# Patient Record
Sex: MALE | Race: WHITE
[De-identification: names, ages, dates, MRNs, and addresses within clinical notes are randomized per-mention and may not be internally consistent; named-entity substitution may affect disease eponyms.]

---

## 2018-03-15 ENCOUNTER — HOSPITAL ENCOUNTER (OUTPATIENT)
Dept: HOSPITAL 14 - H.ER | Age: 68
Setting detail: OBSERVATION
LOS: 1 days | Discharge: HOME | End: 2018-03-16
Attending: GENERAL ACUTE CARE HOSPITAL | Admitting: GENERAL ACUTE CARE HOSPITAL
Payer: COMMERCIAL

## 2018-03-15 DIAGNOSIS — Z79.899: ICD-10-CM

## 2018-03-15 DIAGNOSIS — R07.89: Primary | ICD-10-CM

## 2018-03-15 DIAGNOSIS — F41.9: ICD-10-CM

## 2018-03-15 DIAGNOSIS — E78.5: ICD-10-CM

## 2018-03-15 DIAGNOSIS — I10: ICD-10-CM

## 2018-03-15 DIAGNOSIS — Z87.891: ICD-10-CM

## 2018-03-15 DIAGNOSIS — R00.1: ICD-10-CM

## 2018-03-15 DIAGNOSIS — Z90.49: ICD-10-CM

## 2018-03-15 DIAGNOSIS — Z79.82: ICD-10-CM

## 2018-03-15 DIAGNOSIS — N40.0: ICD-10-CM

## 2018-03-15 DIAGNOSIS — Z85.46: ICD-10-CM

## 2018-03-15 DIAGNOSIS — E78.00: ICD-10-CM

## 2018-03-15 LAB
APTT BLD: 29.6 SECONDS (ref 25.6–37.1)
BASOPHILS # BLD AUTO: 0 K/UL (ref 0–0.2)
BASOPHILS NFR BLD: 0.5 % (ref 0–2)
BUN SERPL-MCNC: 18 MG/DL (ref 9–20)
CALCIUM SERPL-MCNC: 9.6 MG/DL (ref 8.4–10.2)
EOSINOPHIL # BLD AUTO: 0.3 K/UL (ref 0–0.7)
EOSINOPHIL NFR BLD: 4.4 % (ref 0–4)
ERYTHROCYTE [DISTWIDTH] IN BLOOD BY AUTOMATED COUNT: 13 % (ref 11.5–14.5)
GFR NON-AFRICAN AMERICAN: > 60
HGB BLD-MCNC: 13.4 G/DL (ref 12–18)
INR PPP: 1.1 (ref 0.9–1.2)
LYMPHOCYTES # BLD AUTO: 2.4 K/UL (ref 1–4.3)
LYMPHOCYTES NFR BLD AUTO: 36.1 % (ref 20–40)
MCH RBC QN AUTO: 29.7 PG (ref 27–31)
MCHC RBC AUTO-ENTMCNC: 32.7 G/DL (ref 33–37)
MCV RBC AUTO: 90.7 FL (ref 80–94)
MONOCYTES # BLD: 0.7 K/UL (ref 0–0.8)
MONOCYTES NFR BLD: 10 % (ref 0–10)
NEUTROPHILS # BLD: 3.2 K/UL (ref 1.8–7)
NEUTROPHILS NFR BLD AUTO: 49 % (ref 50–75)
NRBC BLD AUTO-RTO: 0.2 % (ref 0–0)
PLATELET # BLD: 243 K/UL (ref 130–400)
PMV BLD AUTO: 8.3 FL (ref 7.2–11.7)
PROTHROMBIN TIME: 12 SECONDS (ref 9.8–13.1)
RBC # BLD AUTO: 4.52 MIL/UL (ref 4.4–5.9)
WBC # BLD AUTO: 6.5 K/UL (ref 4.8–10.8)

## 2018-03-15 PROCEDURE — 80061 LIPID PANEL: CPT

## 2018-03-15 PROCEDURE — 71046 X-RAY EXAM CHEST 2 VIEWS: CPT

## 2018-03-15 PROCEDURE — 36415 COLL VENOUS BLD VENIPUNCTURE: CPT

## 2018-03-15 PROCEDURE — 84484 ASSAY OF TROPONIN QUANT: CPT

## 2018-03-15 PROCEDURE — 80048 BASIC METABOLIC PNL TOTAL CA: CPT

## 2018-03-15 PROCEDURE — 93005 ELECTROCARDIOGRAM TRACING: CPT

## 2018-03-15 PROCEDURE — 85610 PROTHROMBIN TIME: CPT

## 2018-03-15 PROCEDURE — 99285 EMERGENCY DEPT VISIT HI MDM: CPT

## 2018-03-15 PROCEDURE — 85730 THROMBOPLASTIN TIME PARTIAL: CPT

## 2018-03-15 PROCEDURE — 85025 COMPLETE CBC W/AUTO DIFF WBC: CPT

## 2018-03-15 NOTE — ED PDOC
HPI: Chest Pain


Time Seen by Provider: 03/15/18 06:19


Chief Complaint (Nursing): Chest Pain


Chief Complaint (Provider): Chest Pain


History Per: Patient


History/Exam Limitations: no limitations


Onset/Duration Of Symptoms: Hrs (7 hours ago)


Current Symptoms Are (Timing): Still Present


Additional Complaint(s): 





68 yo male with a history of hypertension, hyperlipidemia, and anxiety, 

presents to the ED complaining of non-radiating chest pain, onset of 7 hours 

ago. Patient denies any associated with shortness of breath or diaphoresis. He 

woke up this morning, roughly an hour ago, to go the bathroom and upon 

returning to bed he felt the pain again. Of note, the patient took 4 baby 

aspirin prior to arrival. Denies drugs.





Past Medical History


Reviewed: Historical Data, Nursing Documentation, Vital Signs


Vital Signs: 


 Last Vital Signs











Temp  97.5 F L  03/15/18 06:15


 


Pulse  50 L  03/15/18 06:45


 


Resp  14   03/15/18 06:15


 


BP  130/82   03/15/18 06:31


 


Pulse Ox  95   03/15/18 06:45














- Medical History


PMH: Anxiety, HTN, Hypercholesterolemia





- Surgical History


Surgical History: Cholecystectomy, Hernia Repair, Tonsillectomy





- Family History


Family History: States: Unknown Family Hx





- Social History


Current smoker - smoking cessation education provided: No


Ex-Smoker (has not smoked in the last 12 months): No


Alcohol: None


Drugs: Denies





- Immunization History


Hx Tetanus Toxoid Vaccination: No


Hx Influenza Vaccination: No





- Home Medications


Home Medications: 


 Ambulatory Orders











 Medication  Instructions  Recorded


 


Aspirin 81 mg PO DAILY 04/07/15


 


Dicyclomine [Bentyl] 20 mg PO QID PRN #20 tab 04/07/15


 


Doxazosin [Cardura] 8 mg PO DAILY 04/07/15


 


Fenofibrate [Fenoglide] 120 mg PO DAILY 04/07/15


 


Metoprolol Tartrate [Lopressor] 200 mg PO DAILY 04/07/15


 


Multivit,Iron,Min 5/Folic Acid 1 tab PO DAILY 04/07/15





[Strovite Forte]  


 


Omeprazole 20 mg PO DAILY 04/07/15


 


Ondansetron [Zofran] 4 mg PO Q8H PRN #15 tab 04/07/15


 


Telmisartan/Hydrochlorothiazid 1 tab PO DAILY 04/07/15





[Telmisartan-Hydrochlorothiazide  





25 mg-80 mg]  


 


Azithromycin [Zithromax Z-Jeronimo] 250 mg PO DAILY #1 packet 05/01/16


 


Fexofenadine/Pseudoephedrine 1 each PO Q12H PRN #30 tab 05/01/16





[Allegra-D 12 Hour Tablet]  


 


Fluticasone Propionate [Flonase] 2 actuation NS BID #1 unit 05/01/16














- Allergies


Allergies/Adverse Reactions: 


 Allergies











Allergy/AdvReac Type Severity Reaction Status Date / Time


 


No Known Allergies Allergy   Verified 03/15/18 06:15














Review of Systems


ROS Statement: Except As Marked, All Systems Reviewed And Found Negative


Constitutional: Negative for: Fever, Sweats


Cardiovascular: Positive for: Chest Pain


Respiratory: Negative for: Shortness of Breath





Physical Exam





- Reviewed


Nursing Documentation Reviewed: Yes


Vital Signs Reviewed: Yes





- Physical Exam


Appears: Positive for: Well, Non-toxic, No Acute Distress


Head Exam: Positive for: ATRAUMATIC, NORMAL INSPECTION, NORMOCEPHALIC


Skin: Positive for: Normal Color, Warm, DRY


Eye Exam: Positive for: EOMI, Normal appearance, PERRL


ENT: Positive for: Normal ENT Inspection


Neck: Positive for: Normal, Painless ROM


Cardiovascular/Chest: Positive for: Regular Rate, Rhythm.  Negative for: Murmur


Respiratory: Positive for: Normal Breath Sounds.  Negative for: Respiratory 

Distress


Gastrointestinal/Abdominal: Positive for: Normal Exam, Soft.  Negative for: 

Tenderness


Back: Positive for: Normal Inspection


Extremity: Positive for: Normal ROM.  Negative for: Pedal Edema, Deformity


Neurologic/Psych: Positive for: Alert, Oriented.  Negative for: Motor/Sensory 

Deficits





- ECG


ECG: Positive for: Interpreted By Me, Viewed By Me


ECG Rhythm: Positive for: Sinus Bradycardia


Interpretation Of Abn EKG: t-wave flattening in the inferolateral leads


Rate: 50


O2 Sat by Pulse Oximetry: 95 (RA)


Pulse Ox Interpretation: Normal





Medical Decision Making


Medical Decision Making: 





Time:


--06:27


Impression: 


--Nonspecific chest pain


Plan:


--ekg


--labs troponin I


--PTT


--PT 


--chest x-ray two views 


--cardiac montior 





Reassess


--06:33


Patient with significant cardiac risk factors, will require workup for possible 

ACS. No old EKG available for comparison.


07:00


Patient to be signed out to Dr. Lomeli pending workup. 


Scribe Attestation:


Documented by Ez Newberry acting as a scribe for Carmelo Rosales MD. 





Provider Attestation:


All medical record entries made by the Scribe were at my direction and 

personally dictated by me. I have reviewed the chart and agree that the record 

accurately reflects my personal performance of the history, physical exam, 

medical decision making, and the department course for this patient. I have 

also personally directed, reviewed, and agree with the discharge instructions 

and disposition.





Disposition





- Clinical Impression


Clinical Impression: 


 Chest pain








- Patient ED Disposition


Is Patient to be Admitted: Transfer of Care


Discussed With : Lore Lomeli


Doctor Will See Patient In The: ED





- Disposition


Disposition: Transfer of Care


Disposition Time: 07:00


Condition: FAIR


Forms:  [facility] (English)


Patient Signed Over To: Lore Lomeli


Handoff Comments: pending workup

## 2018-03-15 NOTE — RAD
HISTORY:

chest pain  



COMPARISON:

Chest radiographs 05/01/2016.



TECHNIQUE:

Chest PA and lateral



FINDINGS:



LUNGS:

No active pulmonary disease.



PLEURA:

No significant pleural effusion identified. No pneumothorax apparent.



CARDIOVASCULAR:

Normal.



OSSEOUS STRUCTURES:

No significant abnormalities.



VISUALIZED UPPER ABDOMEN:

Normal.



OTHER FINDINGS:

None.



IMPRESSION:

No interval acute cardiopulmonary disease appreciated.

## 2018-03-15 NOTE — ED PDOC
- Laboratory Results


Result Diagrams: 


 03/16/18 05:40





 03/16/18 05:30





- ECG


O2 Sat by Pulse Oximetry: 95 (RA)


Pulse Ox Interpretation: Normal





Medical Decision Making


Medical Decision Making: 


Time: 7:00


Patient signed out to me by Dr. Garcia pending labs and radiology.





CXR:


FINDINGS:


LUNGS:


No active pulmonary disease.


PLEURA:


No significant pleural effusion identified. No pneumothorax apparent.


CARDIOVASCULAR:


Normal.


OSSEOUS STRUCTURES:


No significant abnormalities.


VISUALIZED UPPER ABDOMEN:


Normal.


OTHER FINDINGS:


None.


IMPRESSION:


No interval acute cardiopulmonary disease appreciated.





--------------------------------------------------------------------------------

-----------------


Scribe Attestation:


Documented by Vonda Tavares, acting as a scribe for Lore Lomeli MD.





Provider Scribe Attestation:


All medical record entries made by the Scribe were at my direction and 

personally dictated by me. I have reviewed the chart and agree that the record 

accurately reflects my personal performance of the history, physical exam, 

medical decision making, and the department course for this patient. I have 

also personally directed, reviewed, and agree with the discharge instructions 

and disposition.











Disposition





- Clinical Impression


Clinical Impression: 


 Chest pain








- POA


Present On Arrival: None





- Disposition


Disposition: Hospitalized as Observation Patient


Disposition Time: 10:49


Condition: STABLE

## 2018-03-15 NOTE — CP.PCM.HP
History of Present Illness





- History of Present Illness


History of Present Illness: 





66 yo male with history of HTN, HLD and Anxiety came in because of left sided 

chest pain non-radiating not associated with nausea or SOB starting last night 

lasting for a few minutes then spontaneously relieved. Chest pain recurred 

early this morning causing him to call 911. He was given 4 baby aspirin 

although pain already was starting to fade away. Patient was brought to ER and 

placed on chest pain observation.




















Present on Admission





- Present on Admission


Any Indicators Present on Admission: No


History of DVT/PE: No


History of Uncontrolled Diabetes: No


Urinary Catheter: No


Decubitus Ulcer Present: No





Review of Systems





- Review of Systems


All systems: reviewed and no additional remarkable complaints except (aside 

from those mentioned above, 14 point system review were negative by me)





Past Patient History





- Tetanus Immunizations


Tetanus Immunization: Unknown





- Past Medical History & Family History


Pertinent Family History: 





Father and Mother had CAD and HTN


Father and brother had diabetes





- Past Social History


Smoking Status: Former Smoker


Alcohol: Social


Drugs: Denies


Home Situation {Lives}: With Family





- CARDIAC


Hx Hypercholesterolemia: Yes


Hx Hypertension: Yes





- PULMONARY


Hx Respiratory Disorders: No





- NEUROLOGICAL


Hx Neurological Disorder: No





- HEENT


Hx HEENT Problems: No





- RENAL


Hx Chronic Kidney Disease: No





- ENDOCRINE/METABOLIC


Hx Endocrine Disorders: No





- HEMATOLOGICAL/ONCOLOGICAL


Hx Blood Disorders: No





- INTEGUMENTARY


Hx Dermatological Problems: No





- MUSCULOSKELETAL/RHEUMATOLOGICAL


Hx Musculoskeletal Disorders: No





- GASTROINTESTINAL


Hx Gastrointestinal Disorders: No





- GENITOURINARY/GYNECOLOGICAL


Hx Prostate Cancer: Yes (had radiation on 2013)





- PSYCHIATRIC


Hx Anxiety: Yes





- SURGICAL HISTORY


Hx Surgeries: Yes


Hx Cholecystectomy: Yes


Hx Herniorrhaphy: Yes


Hx Tonsillectomy: Yes





- ANESTHESIA


Hx Anesthesia: Yes


Hx Anesthesia Reactions: No





Meds


Allergies/Adverse Reactions: 


 Allergies











Allergy/AdvReac Type Severity Reaction Status Date / Time


 


No Known Allergies Allergy   Verified 03/15/18 06:15














Physical Exam





- Constitutional


Appears: No Acute Distress, Other (obese)





- Head Exam


Head Exam: ATRAUMATIC





- Eye Exam


Eye Exam: absent: Scleral icterus





- ENT Exam


ENT Exam: Mucous Membranes Moist





- Neck Exam


Neck exam: Negative for: Meningismus





- Respiratory Exam


Respiratory Exam: absent: Chest Wall Tenderness, Rales, Rhonchi, Wheezes, 

Respiratory Distress





- Cardiovascular Exam


Cardiovascular Exam: REGULAR RHYTHM, +S1, +S2





- GI/Abdominal Exam


GI & Abdominal Exam: Soft.  absent: Tenderness





- Rectal Exam


Rectal Exam: Deferred





- Extremities Exam


Extremities exam: Negative for: calf tenderness, pedal edema





- Back Exam


Back exam: absent: tenderness





- Neurological Exam


Neurological exam: Alert, Oriented x3





- Psychiatric Exam


Psychiatric exam: Normal Affect





- Skin


Skin Exam: Dry, Intact





Results





- Vital Signs


Recent Vital Signs: 





 Last Vital Signs











Temp  97.5 F L  03/15/18 06:15


 


Pulse  55 L  03/15/18 09:32


 


Resp  18   03/15/18 09:32


 


BP  118/79   03/15/18 09:32


 


Pulse Ox  98   03/15/18 09:32














- Labs


Result Diagrams: 


 03/15/18 06:45





 03/15/18 06:45


Labs: 





 Laboratory Results - last 24 hr











  03/15/18 03/15/18 03/15/18





  06:45 06:45 06:45


 


WBC   6.5 


 


RBC   4.52 


 


Hgb   13.4 


 


Hct   41.0 


 


MCV   90.7 


 


MCH   29.7 


 


MCHC   32.7 L 


 


RDW   13.0 


 


Plt Count   243 


 


MPV   8.3 


 


Neut % (Auto)   49.0 L 


 


Lymph % (Auto)   36.1 


 


Mono % (Auto)   10.0 


 


Eos % (Auto)   4.4 H 


 


Baso % (Auto)   0.5 


 


Neut # (Auto)   3.2 


 


Lymph # (Auto)   2.4 


 


Mono # (Auto)   0.7 


 


Eos # (Auto)   0.3 


 


Baso # (Auto)   0.0 


 


PT    12.0


 


INR    1.1


 


APTT    29.6


 


Sodium  144  


 


Potassium  4.4  


 


Chloride  101  


 


Carbon Dioxide  28  


 


Anion Gap  19  


 


BUN  18  


 


Creatinine  1.2  


 


Est GFR ( Amer)  > 60  


 


Est GFR (Non-Af Amer)  > 60  


 


Random Glucose  104  


 


Calcium  9.6  


 


Troponin I  < 0.0120  














Assessment & Plan





- Assessment and Plan (Free Text)


Assessment: 





66 yo male with history of HTN, HLD and Anxiety came in because of left sided 

chest pain non-radiating not associated with nausea or SOB starting last night 

lasting for a few minutes then spontaneously relieved. Chest pain recurred 

early this morning causing him to call 911. He was given 4 baby aspirin 

although pain already was starting to fade away. Patient was brought to ER and 

placed on chest pain observation.








1. Chest Pain


placed on chest pain observation


serial Troponin and EKG


ASA, BB, statin





2. HTN


BP stable


continue Carvedilol, Telmisartan, Verapamil





3. HLD


continue Fenofibrate





4. DVT prophylaxis


Lovenox 40mg SC daily

## 2018-03-16 VITALS
TEMPERATURE: 97.8 F | RESPIRATION RATE: 18 BRPM | HEART RATE: 59 BPM | OXYGEN SATURATION: 95 % | SYSTOLIC BLOOD PRESSURE: 120 MMHG | DIASTOLIC BLOOD PRESSURE: 74 MMHG

## 2018-03-16 LAB
BASOPHILS # BLD AUTO: 0 K/UL (ref 0–0.2)
BASOPHILS NFR BLD: 0.6 % (ref 0–2)
BUN SERPL-MCNC: 19 MG/DL (ref 9–20)
CALCIUM SERPL-MCNC: 9.5 MG/DL (ref 8.4–10.2)
EOSINOPHIL # BLD AUTO: 0.2 K/UL (ref 0–0.7)
EOSINOPHIL NFR BLD: 3.7 % (ref 0–4)
ERYTHROCYTE [DISTWIDTH] IN BLOOD BY AUTOMATED COUNT: 13.2 % (ref 11.5–14.5)
GFR NON-AFRICAN AMERICAN: > 60
HDLC SERPL-MCNC: 27 MG/DL (ref 30–70)
HGB BLD-MCNC: 12.9 G/DL (ref 12–18)
LDLC SERPL-MCNC: 70 MG/DL (ref 0–129)
LYMPHOCYTES # BLD AUTO: 2.4 K/UL (ref 1–4.3)
LYMPHOCYTES NFR BLD AUTO: 36.6 % (ref 20–40)
MCH RBC QN AUTO: 29.9 PG (ref 27–31)
MCHC RBC AUTO-ENTMCNC: 32.7 G/DL (ref 33–37)
MCV RBC AUTO: 91.3 FL (ref 80–94)
MONOCYTES # BLD: 0.7 K/UL (ref 0–0.8)
MONOCYTES NFR BLD: 10.6 % (ref 0–10)
NEUTROPHILS # BLD: 3.2 K/UL (ref 1.8–7)
NEUTROPHILS NFR BLD AUTO: 48.5 % (ref 50–75)
NRBC BLD AUTO-RTO: 0.1 % (ref 0–0)
PLATELET # BLD: 236 K/UL (ref 130–400)
PMV BLD AUTO: 7.9 FL (ref 7.2–11.7)
RBC # BLD AUTO: 4.32 MIL/UL (ref 4.4–5.9)
WBC # BLD AUTO: 6.6 K/UL (ref 4.8–10.8)

## 2018-03-16 NOTE — CARD
--------------- APPROVED REPORT --------------





EKG Measurement

Heart [id number]

[id number]

[id number]

[id number]

[id number]



<Conclusion>

Sinus bradycardia

Nonspecific T wave abnormality

Abnormal ECG

## 2018-03-16 NOTE — CP.PCM.DIS
Provider





- Provider


Date of Admission: 


03/15/18 10:49





Attending physician: 


Bennie De La Torre MD





Time Spent in preparation of Discharge (in minutes): 25





Diagnosis





- Discharge Diagnosis


(1) Chest pain


Status: Acute   


Comment: patient discharged in stable and improved condition.  there was no 

recurrence of chest pain.  Troponins were negative for ischemia   





(2) HTN (hypertension)


Status: Chronic   


Comment: BP stable however patient was noted to be bradycardic although 

asymptomatic.  Coreg reduced to 12.5mg PO BID.  Verapamil recommended to be DC.

  continue Telmisartan.  patient also on Cardura XL for BPH   





(3) HLD (hyperlipidemia)


Status: Chronic   


Comment: continue Fenofibrate   





Hospital Course





- Lab Results


Lab Results: 


 Most Recent Lab Values











WBC  6.6 K/uL (4.8-10.8)   03/16/18  05:40    


 


RBC  4.32 Mil/uL (4.40-5.90)  L  03/16/18  05:40    


 


Hgb  12.9 g/dL (12.0-18.0)   03/16/18  05:40    


 


Hct  39.4 % (35.0-51.0)   03/16/18  05:40    


 


MCV  91.3 fl (80.0-94.0)   03/16/18  05:40    


 


MCH  29.9 pg (27.0-31.0)   03/16/18  05:40    


 


MCHC  32.7 g/dL (33.0-37.0)  L  03/16/18  05:40    


 


RDW  13.2 % (11.5-14.5)   03/16/18  05:40    


 


Plt Count  236 K/uL (130-400)   03/16/18  05:40    


 


MPV  7.9 fl (7.2-11.7)   03/16/18  05:40    


 


Neut % (Auto)  48.5 % (50.0-75.0)  L  03/16/18  05:40    


 


Lymph % (Auto)  36.6 % (20.0-40.0)   03/16/18  05:40    


 


Mono % (Auto)  10.6 % (0.0-10.0)  H  03/16/18  05:40    


 


Eos % (Auto)  3.7 % (0.0-4.0)   03/16/18  05:40    


 


Baso % (Auto)  0.6 % (0.0-2.0)   03/16/18  05:40    


 


Neut # (Auto)  3.2 K/uL (1.8-7.0)   03/16/18  05:40    


 


Lymph # (Auto)  2.4 K/uL (1.0-4.3)   03/16/18  05:40    


 


Mono # (Auto)  0.7 K/uL (0.0-0.8)   03/16/18  05:40    


 


Eos # (Auto)  0.2 K/uL (0.0-0.7)   03/16/18  05:40    


 


Baso # (Auto)  0.0 K/uL (0.0-0.2)   03/16/18  05:40    


 


PT  12.0 Seconds (9.8-13.1)   03/15/18  06:45    


 


INR  1.1  (0.9-1.2)   03/15/18  06:45    


 


APTT  29.6 Seconds (25.6-37.1)   03/15/18  06:45    


 


Sodium  141 mmol/l (132-148)   03/16/18  05:30    


 


Potassium  4.0 MMOL/L (3.6-5.0)   03/16/18  05:30    


 


Chloride  100 mmol/L ()   03/16/18  05:30    


 


Carbon Dioxide  27 mmol/L (22-30)   03/16/18  05:30    


 


Anion Gap  18  (10-20)   03/16/18  05:30    


 


BUN  19 mg/dl (9-20)   03/16/18  05:30    


 


Creatinine  1.1 mg/dl (0.8-1.5)   03/16/18  05:30    


 


Est GFR ( Amer)  > 60   03/16/18  05:30    


 


Est GFR (Non-Af Amer)  > 60   03/16/18  05:30    


 


Random Glucose  106 mg/dL ()   03/16/18  05:30    


 


Calcium  9.5 mg/dL (8.4-10.2)   03/16/18  05:30    


 


Troponin I  < 0.0120 ng/mL (0.00-0.120)   03/15/18  22:46    


 


Triglycerides  145 mg/DL (0-149)   03/16/18  05:30    


 


Cholesterol  121 mg/dL (0-199)   03/16/18  05:30    


 


LDL Cholesterol Direct  70 mg/dL (0-129)   03/16/18  05:30    


 


HDL Cholesterol  27 MG/DL (30-70)  L  03/16/18  05:30    














- Hospital Course


Hospital Course: 





68 yo male with history of HTN, HLD and Anxiety came in because of left sided 

chest pain non-radiating not associated with nausea or SOB starting last night 

lasting for a few minutes then spontaneously relieved. Chest pain recurred 

early this morning causing him to call 911. He was given 4 baby aspirin 

although pain already was starting to fade away. Patient was brought to ER and 

placed on chest pain observation. There was no recurrence of chest pain and 

serial Troponins were negative for ischemic events





Discharge Exam





- Head Exam


Head Exam: ATRAUMATIC





- Eye Exam


Eye Exam: absent: Scleral icterus





- ENT Exam


ENT Exam: Mucous Membranes Moist





- Respiratory Exam


Respiratory Exam: absent: Rales, Rhonchi, Wheezes, Respiratory Distress





- Cardiovascular Exam


Cardiovascular Exam: REGULAR RHYTHM, +S1, +S2





- GI/Abdominal Exam


GI & Abdominal Exam: Soft.  absent: Tenderness





- Rectal Exam


Rectal Exam: Deferred





- Neurological Exam


Neurological exam: Alert, Oriented x3





- Psychiatric Exam


Psychiatric exam: Normal Affect





- Skin


Skin Exam: Dry, Intact





Discharge Plan





- Discharge Medications


Prescriptions: 


Carvedilol [Coreg] 12.5 mg PO BID #60 tab





- Follow Up Plan


Condition: FAIR


Disposition: HOME/ ROUTINE


Instructions:  Chest Pain (DC)


Referrals: 


Deepak Porter MD [Family Provider] -